# Patient Record
Sex: FEMALE | Race: WHITE | Employment: UNEMPLOYED | URBAN - METROPOLITAN AREA
[De-identification: names, ages, dates, MRNs, and addresses within clinical notes are randomized per-mention and may not be internally consistent; named-entity substitution may affect disease eponyms.]

---

## 2017-01-11 ENCOUNTER — OFFICE VISIT (OUTPATIENT)
Dept: NEUROLOGY | Age: 52
End: 2017-01-11

## 2017-01-11 VITALS
BODY MASS INDEX: 38.42 KG/M2 | HEIGHT: 62 IN | SYSTOLIC BLOOD PRESSURE: 168 MMHG | HEART RATE: 70 BPM | TEMPERATURE: 98.9 F | WEIGHT: 208.8 LBS | OXYGEN SATURATION: 99 % | DIASTOLIC BLOOD PRESSURE: 97 MMHG

## 2017-01-11 DIAGNOSIS — I67.9 CEREBROVASCULAR DISEASE: ICD-10-CM

## 2017-01-11 DIAGNOSIS — I10 ESSENTIAL HYPERTENSION WITH GOAL BLOOD PRESSURE LESS THAN 130/80: ICD-10-CM

## 2017-01-11 DIAGNOSIS — I69.30 LATE EFFECT OF CEREBROVASCULAR ACCIDENT: ICD-10-CM

## 2017-01-11 DIAGNOSIS — Z74.09 IMPAIRED MOBILITY AND ADLS: ICD-10-CM

## 2017-01-11 DIAGNOSIS — G81.93: Primary | ICD-10-CM

## 2017-01-11 DIAGNOSIS — Z86.73 HISTORY OF CVA (CEREBROVASCULAR ACCIDENT): ICD-10-CM

## 2017-01-11 DIAGNOSIS — Z78.9 IMPAIRED MOBILITY AND ADLS: ICD-10-CM

## 2017-01-11 RX ORDER — BACLOFEN 10 MG/1
10 TABLET ORAL 3 TIMES DAILY
Qty: 90 TAB | Refills: 3 | Status: SHIPPED | OUTPATIENT
Start: 2017-01-11 | End: 2017-04-12 | Stop reason: SDUPTHER

## 2017-01-11 NOTE — PROGRESS NOTES
[facility]   [address] [place] [address], [address] [facility], [address] [address]      Date:     Name:  Emilee Joseph  :  1965  MRN:  [id number]     PCP:  Romulo Wilde NP    Chief Complaint   Patient presents with    Extremity Weakness     Follow-up on right sided weakness         HISTORY OF PRESENT ILLNESS:  Patient returns in followup from hospitalization for stroke She  remains weak on the right side weaker on the leg. She she is able to walk in a.using a cane. She reports she still is unable to stand for 8 hours, which would be a job requirement. She describes pain stiffness right leg lyle with weight bearing disrupting sleep as well She denies any recent TIA like symptoms. She continues to have decreased sensation right side as well. Speech is improved. no going to therapy  Review of Systems - History obtained from the patient  General ROS: negative  Psychological ROS: negative  Cardiovascular ROS: no chest pain or dyspnea on exertion  Gastrointestinal ROS: no abdominal pain, change in bowel habits, or black or bloody stools  Musculoskeletal ROS: positive for - muscular weakness  Neurological ROS: no TIA or stroke symptoms since hospitalization  Dermatological ROS: negative     Current Outpatient Prescriptions   Medication Sig    SITagliptin (JANUVIA) 50 mg tablet Take 50 mg by mouth daily.  baclofen (LIORESAL) 10 mg tablet Take 1 Tab by mouth three (3) times daily.  furosemide (LASIX) 20 mg tablet Take  by mouth daily.  carvedilol (COREG) 12.5 mg tablet Take 0.5 Tabs by mouth two (2) times daily (with meals). Indications: HYPERTENSION    atorvastatin (LIPITOR) 80 mg tablet Take 1 Tab by mouth nightly.  losartan (COZAAR) 25 mg tablet Take 1 Tab by mouth daily. Indications: HYPERTENSION    cholecalciferol (VITAMIN D3) 1,000 unit tablet Take 2 Tabs by mouth daily.  aspirin 81 mg chewable tablet Take 1 Tab by mouth daily.     metFORMIN ER (GLUCOPHAGE XR) 500 mg tablet Take 800 mg by mouth two (2) times a day. Indications: type 2 diabetes mellitus     No current facility-administered medications for this visit. No Known Allergies  Past Medical History   Diagnosis Date    Acute ischemic multifocal anterior circulation stroke involving left-sided vessel ([facility] 4/2/2016     Acute Ischemic Multifocal Stroke (acute multifocal infarctions in the left frontal/parietal lobe and left temporal lobe) with residual right hemiparesis and expressive aphasia     Coronary artery disease involving native coronary artery of native heart     Decreased calculated glomerular filtration rate (GFR) 4/2/2016     Calculated GFR is equivalent to that of CKD stage 2 (60-89 ml/min)    Diastolic dysfunction without heart failure 7/29/2014    Dyslipidemia     Essential hypertension     History of myocardial infarction      2x    Memory disorder      Difficulty     Obesity, Class I, BMI 30-34.9     Received intravenous tissue plasminogen activator (tPA) in emergency department 4/2/2016    Tobacco abuse     Type 2 diabetes mellitus ([facility]     Vitamin D insufficiency 4/8/2016     Vitamin D 25-Hydroxy (4/8/2016) = 24.9     Past Surgical History   Procedure Laterality Date    Hx orthopaedic Left      wrist    Hx adenoidectomy      Hx tubal ligation      Hx other surgical       S/P Tongue clipping    Hx other surgical       S/P Incision and drainage of Staph infection on left thigh     Social History     Social History    Marital status: LEGALLY      Spouse name: N/A    Number of children: N/A    Years of education: N/A     Occupational History    Not on file. Social History Main Topics    Smoking status: Former Smoker     Packs/day: 1.50     Years: 38.00     Quit date: 4/2/2016    Smokeless tobacco: Never Used    Alcohol use No    Drug use: No    Sexual activity: Not on file     Other Topics Concern    Not on file     Social History Narrative     History reviewed.  No pertinent family history. PHYSICAL EXAMINATION:    Visit Vitals    BP (!) 168/97    Pulse 70    Temp 98.9 °F (37.2 °C) (Oral)    Ht 5' 2\" (1.575 m)    Wt 94.7 kg (208 lb 12.8 oz)    SpO2 99%    BMI 38.19 kg/m2     General:  Well defined, nourished, and groomed individual in no acute distress. Neck: Supple, nontender, thyroid within normal limits,, no bruits, no pain with resistance to active range of motion. Heart: Regular rate and rhythm, no murmurs, rub, or gallop. Normal S1S2. Lungs:  Clear to auscultation  Musculoskeletal:  Extremities revealed no edema  Psych:  Good mood and bright affect    NEUROLOGICAL EXAMINATION:     Mental Status:   Alert and oriented to person, place, and time with recent and remote memory intact. Attention span and concentration are normal. Speech is fluent with a full fund of knowledge. Cranial Nerves:    II, III, IV, VI:  Visual acuity grossly intact. Visual fields are normal.    Pupils are equal, round, and reactive to light and accommodation. Extra-ocular movements are full and fluid. no ptosis or nystagmus. V-XII: Facial features are symmetric, with normal sensation and strength. Motor Examination: 5/5 muscle strength throughout left side mild rle weakness . No cogwheel rigidity or clonus present. increased   Tone rle    Coordination:  No resting or intention tremor. right hemiparetic gait        Lab/Data Review:  Reviewed notes from hospitalization,Dr Winston Colon,2Nd Floor ICD-9-CM    1. Hemiplegia affecting right nondominant side (HCC) G81.93 342.92 REFERRAL TO PHYSICAL THERAPY   2. Essential hypertension with goal blood pressure less than 130/80 I10 401.9    3. Impaired mobility and ADLs Z74.09 799.89    4.  History of CVA (cerebrovascular accident) Z86.73 V12.54 REFERRAL TO PHYSICAL THERAPY   5. Cerebrovascular disease I67.9 437.9 REFERRAL TO PHYSICAL THERAPY   6. Late effect of cerebrovascular accident I69.30 438.9 REFERRAL TO PHYSICAL THERAPY       Nadegedystceli Neighbor a 45 y.o. y.o.  who has risk factors including hypertension,diabetes,smoking   Plan:   Reviewed risks and benefits of baclofen therapy will try pt for gait training  Not able to return to working standing . walking  8 hrs. day may stop plavix since greater than 3 months without stroke   I spent 30 minutes with the patient in face-to-face consultation, of which greater than 50% was spent in counseling and coordination of care as described above.

## 2017-01-18 ENCOUNTER — DOCUMENTATION ONLY (OUTPATIENT)
Dept: NEUROLOGY | Age: 52
End: 2017-01-18

## 2017-01-18 NOTE — PROGRESS NOTES
Received notification from patient that she doesn;t want to go to [address] but prefers  [facility] in . [place] [address], [facility], [address] [facility]  [phone]    Records and Referral re-faxed to this company per patient request.

## 2017-04-10 ENCOUNTER — DOCUMENTATION ONLY (OUTPATIENT)
Dept: NEUROLOGY | Age: 52
End: 2017-04-10

## 2017-04-10 NOTE — PROGRESS NOTES
Letter of peer review and request for agreement of documentation rec'd from [facility] and placed on Dr. Conner kern

## 2017-04-12 ENCOUNTER — OFFICE VISIT (OUTPATIENT)
Dept: NEUROLOGY | Age: 52
End: 2017-04-12

## 2017-04-12 VITALS
TEMPERATURE: 99 F | HEIGHT: 62 IN | BODY MASS INDEX: 39.34 KG/M2 | WEIGHT: 213.8 LBS | DIASTOLIC BLOOD PRESSURE: 68 MMHG | RESPIRATION RATE: 18 BRPM | HEART RATE: 75 BPM | OXYGEN SATURATION: 98 % | SYSTOLIC BLOOD PRESSURE: 136 MMHG

## 2017-04-12 DIAGNOSIS — I67.9 CEREBROVASCULAR DISEASE: ICD-10-CM

## 2017-04-12 DIAGNOSIS — Z86.73 HISTORY OF CVA (CEREBROVASCULAR ACCIDENT): ICD-10-CM

## 2017-04-12 DIAGNOSIS — Z74.09 IMPAIRED MOBILITY AND ADLS: ICD-10-CM

## 2017-04-12 DIAGNOSIS — G81.03: Primary | ICD-10-CM

## 2017-04-12 DIAGNOSIS — I10 ESSENTIAL HYPERTENSION WITH GOAL BLOOD PRESSURE LESS THAN 130/80: ICD-10-CM

## 2017-04-12 DIAGNOSIS — Z78.9 IMPAIRED MOBILITY AND ADLS: ICD-10-CM

## 2017-04-12 RX ORDER — GLIPIZIDE 5 MG/1
5 TABLET ORAL DAILY
COMMUNITY

## 2017-04-12 RX ORDER — LANOLIN ALCOHOL/MO/W.PET/CERES
2500 CREAM (GRAM) TOPICAL 2 TIMES DAILY
COMMUNITY

## 2017-04-12 RX ORDER — FISH OIL/DHA/EPA 1200-144MG
1200 CAPSULE ORAL 2 TIMES DAILY
COMMUNITY

## 2017-04-12 RX ORDER — BACLOFEN 10 MG/1
20 TABLET ORAL 3 TIMES DAILY
Qty: 90 TAB | Refills: 5 | Status: SHIPPED | OUTPATIENT
Start: 2017-04-12 | End: 2017-07-25 | Stop reason: SDUPTHER

## 2017-04-12 NOTE — MR AVS SNAPSHOT
Visit Information Date & Time Provider Department Dept. Phone Encounter #  
 4/12/2017  1:45 PM Aneta Barorn MD [address] [phone] [id number] Follow-up Instructions Return in about 6 months (around 10/12/2017). Follow-up and Disposition History Your Appointments 10/18/2017 11:00 AM  
Follow Up with Aneta Barron MD  
[address] Appt Note: 6mon f/u  
 [address] [facility] [facility] [address] [place] [address]  
[phone]  
  
   
 [facility] [address] Upcoming Health Maintenance Date Due Hepatitis C Screening 1965 FOOT EXAM Q1 2/14/1974 MICROALBUMIN Q1 2/14/1974 EYE EXAM RETINAL OR DILATED Q1 2/14/1974 DTaP/Tdap/Td series (1 - Tdap) 2/14/1985 PAP AKA CERVICAL CYTOLOGY 2/14/1985 BREAST CANCER SCRN MAMMOGRAM 2/14/2014 FOBT Q 1 YEAR AGE 50-75 2/14/2014 INFLUENZA AGE 9 TO ADULT 8/1/2016 HEMOGLOBIN A1C Q6M 10/4/2016 LIPID PANEL Q1 4/4/2017 Allergies as of 4/12/2017  Review Complete On: 4/12/2017 By: Aneta Barron MD  
 No Known Allergies Current Immunizations  Reviewed on 4/4/2016 Name Date Influenza Vaccine 10/8/2015 Pneumococcal Polysaccharide (PPSV-23) 10/8/2015 Not reviewed this visit You Were Diagnosed With   
  
 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 
ICD-9-CM: 342.02 Essential hypertension with goal blood pressure less than 130/80     ICD-10-CM: I10 
ICD-9-CM: 401.9 Impaired mobility and ADLs     ICD-10-CM: Z74.09 
ICD-9-CM: 799.89 History of CVA (cerebrovascular accident)     ICD-10-CM: Z86.73 
ICD-9-CM: V12.54 Cerebrovascular disease     ICD-10-CM: I67.9 ICD-9-CM: 437.9 Vitals BP Pulse Temp Resp Height(growth percentile) Weight(growth percentile) 136/68 75 99 °F (37.2 °C) (Oral) 18 5' 2\" (1.575 m) 213 lb 12.8 oz (97 kg) SpO2 BMI OB Status Smoking Status 98% 39.1 kg/m2 Postmenopausal Former Smoker BMI and BSA Data Body Mass Index Body Surface Area  
 39.1 kg/m 2 2.06 m 2 Preferred Pharmacy Pharmacy Name Phone [facility] [address] [address], [address] [place] [phone] Your Updated Medication List  
  
   
This list is accurate as of: 4/12/17  3:21 PM.  Always use your most recent med list.  
  
  
  
  
 aspirin 81 mg chewable tablet Take 1 Tab by mouth daily. atorvastatin 80 mg tablet Commonly known as:  LIPITOR Take 1 Tab by mouth nightly. baclofen 10 mg tablet Commonly known as:  LIORESAL Take 2 Tabs by mouth three (3) times daily. carvedilol 12.5 mg tablet Commonly known as:  [place] Flatten Take 0.5 Tabs by mouth two (2) times daily (with meals). Indications: HYPERTENSION  
  
 cholecalciferol 1,000 unit tablet Commonly known as:  VITAMIN D3 Take 2 Tabs by mouth daily. fish oil-dha-epa 1,200-144-216 mg Cap Take 1,200 mg by mouth two (2) times a day. furosemide 20 mg tablet Commonly known as:  LASIX Take  by mouth daily. glipiZIDE 5 mg tablet Commonly known as:  [place] Hernandez Take 5 mg by mouth daily. JANUVIA 50 mg tablet Generic drug:  SITagliptin Take 50 mg by mouth daily. losartan 25 mg tablet Commonly known as:  COZAAR Take 1 Tab by mouth daily. Indications: HYPERTENSION  
  
 metFORMIN  mg tablet Commonly known as:  GLUCOPHAGE XR Take 800 mg by mouth two (2) times a day. Indications: type 2 diabetes mellitus VITAMIN B-12 1,000 mcg tablet Generic drug:  cyanocobalamin Take 2,500 mcg by mouth two (2) times a day. Prescriptions Sent to Pharmacy Refills  
 baclofen (LIORESAL) 10 mg tablet 5 Sig: Take 2 Tabs by mouth three (3) times daily.   
 Class: Normal  
 Pharmacy: [facility] [address] [facility] #: [phone] Route: Oral  
  
Follow-up Instructions Return in about 6 months (around 10/12/2017). Introducing [facility] SERVICES! Dear Lonny Lara: Thank you for requesting a [employer] account. Our records indicate that you already have an active [employer] account. You can access your account anytime at [email or web address] Did you know that you can access your hospital and ER discharge instructions at any time in [employer]? You can also review all of your test results from your hospital stay or ER visit. Additional Information If you have questions, please visit the Frequently Asked Questions section of the [employer] website at [email or web address] Remember, [employer] is NOT to be used for urgent needs. For medical emergencies, dial 911. Now available from your iPhone and Android! Please provide this summary of care documentation to your next provider. Your primary care clinician is listed as [place]. If you have any questions after today's visit, please call [phone].

## 2017-04-12 NOTE — PROGRESS NOTES
[address], [address] [facility], [address]      Date:     Name:  Pepe Angeles  :  1965  MRN:  [id number]     PCP:  Sade Maldonado NP    Chief Complaint   Patient presents with    Neurologic Problem     followup CVA/physical therapy         HISTORY OF PRESENT ILLNESS:  Patient returns in followup from hospitalization for stroke She  remains weak on the right side weaker on the leg. She she is able to walk in a.using a cane. She reports she still is unable to stand for 8 hours, which would be a job requirement. She describes pain stiffness right leg lyle with weight bearing disrupting sleep as well She denies any recent TIA like symptoms. She continues to have decreased sensation right side as well. Pt helped per patients no notes received  Review of Systems - History obtained from the patient  General ROS: negative  Psychological ROS: negative  Cardiovascular ROS: no chest pain or dyspnea on exertion  Gastrointestinal ROS: no abdominal pain, change in bowel habits, or black or bloody stools  Musculoskeletal ROS: positive for - muscular weakness  Neurological ROS: no TIA or stroke symptoms since hospitalization  Dermatological ROS: negative     Current Outpatient Prescriptions   Medication Sig    glipiZIDE (GLUCOTROL) 5 mg tablet Take 5 mg by mouth daily.  fish oil-dha-epa 1,200-144-216 mg cap Take 1,200 mg by mouth two (2) times a day.  cyanocobalamin (VITAMIN B-12) 1,000 mcg tablet Take 2,500 mcg by mouth two (2) times a day.  baclofen (LIORESAL) 10 mg tablet Take 2 Tabs by mouth three (3) times daily.  carvedilol (COREG) 12.5 mg tablet Take 0.5 Tabs by mouth two (2) times daily (with meals). Indications: HYPERTENSION    atorvastatin (LIPITOR) 80 mg tablet Take 1 Tab by mouth nightly.  losartan (COZAAR) 25 mg tablet Take 1 Tab by mouth daily. Indications: HYPERTENSION (Patient taking differently: Take 100 mg by mouth daily.  Indications: hypertension)    cholecalciferol (VITAMIN D3) 1,000 unit tablet Take 2 Tabs by mouth daily.  aspirin 81 mg chewable tablet Take 1 Tab by mouth daily.  metFORMIN ER (GLUCOPHAGE XR) 500 mg tablet Take 800 mg by mouth two (2) times a day. Indications: type 2 diabetes mellitus    SITagliptin (JANUVIA) 50 mg tablet Take 50 mg by mouth daily.  furosemide (LASIX) 20 mg tablet Take  by mouth daily. No current facility-administered medications for this visit. No Known Allergies  Past Medical History:   Diagnosis Date    Acute ischemic multifocal anterior circulation stroke involving left-sided vessel ([facility] 4/2/2016    Acute Ischemic Multifocal Stroke (acute multifocal infarctions in the left frontal/parietal lobe and left temporal lobe) with residual right hemiparesis and expressive aphasia     Coronary artery disease involving native coronary artery of native heart     Decreased calculated glomerular filtration rate (GFR) 4/2/2016    Calculated GFR is equivalent to that of CKD stage 2 (60-89 ml/min)    Diastolic dysfunction without heart failure 7/29/2014    Dyslipidemia     Essential hypertension     History of myocardial infarction     2x    Memory disorder     Difficulty     Obesity, Class I, BMI 30-34.9     Received intravenous tissue plasminogen activator (tPA) in emergency department 4/2/2016    Tobacco abuse     Type 2 diabetes mellitus ([facility]     Vitamin D insufficiency 4/8/2016    Vitamin D 25-Hydroxy (4/8/2016) = 24.9     Past Surgical History:   Procedure Laterality Date    HX ADENOIDECTOMY      HX ORTHOPAEDIC Left     wrist    HX OTHER SURGICAL      S/P Tongue clipping    HX OTHER SURGICAL      S/P Incision and drainage of Staph infection on left thigh    HX TUBAL LIGATION       Social History     Social History    Marital status: LEGALLY      Spouse name: N/A    Number of children: N/A    Years of education: N/A     Occupational History    Not on file. Social History Main Topics    Smoking status: Former Smoker     Packs/day: 1.50     Years: 38.00     Quit date: 4/2/2016    Smokeless tobacco: Never Used    Alcohol use No    Drug use: No    Sexual activity: Not on file     Other Topics Concern    Not on file     Social History Narrative     No family history on file. PHYSICAL EXAMINATION:    Visit Vitals    /68    Pulse 75    Temp 99 °F (37.2 °C) (Oral)    Resp 18    Ht 5' 2\" (1.575 m)    Wt 97 kg (213 lb 12.8 oz)    SpO2 98%    BMI 39.1 kg/m2     General:  Well defined, nourished, and groomed individual in no acute distress. Neck: Supple, nontender, thyroid within normal limits,, no bruits, no pain with resistance to active range of motion. Heart: Regular rate and rhythm, no murmurs, rub, or gallop. Normal S1S2. Lungs:  Clear to auscultation  Musculoskeletal:  Extremities revealed no edema  Psych:  Good mood and bright affect    NEUROLOGICAL EXAMINATION:     Mental Status:   Alert and oriented to person, place, and time with recent and remote memory intact. Attention span and concentration are normal. Speech is fluent with a full fund of knowledge. Cranial Nerves:    II, III, IV, VI:  Visual acuity grossly intact. Visual fields are normal.    Pupils are equal, round, and reactive to light and accommodation. Extra-ocular movements are full and fluid. no ptosis or nystagmus. V-XII: Facial features are symmetric, with normal sensation and strength. Motor Examination: 5/5 muscle strength throughout left side mild rle weakness . No cogwheel rigidity or clonus present. increased   Tone rle    Coordination:  No resting or intention tremor. right hemiparetic gait        Lab/Data Review:  Reviewed notes from hospitalization,Dr Winston Alcaraz.,2Nd Floor ICD-9-CM    1. 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  Essential hypertension with goal blood pressure less than 130/80 I10 401.9    3. Impaired mobility and ADLs Z74.09 799.89    4. History of CVA (cerebrovascular accident) Z86.73 V12.54    5. Cerebrovascular disease I67.9 437.9        Kika Davis luisito 47 y.o. y.o.  who has risk factors including hypertension,diabetes,smoking   Plan:   Reviewed risks and benefits of baclofen therapyPatient may benefit from functional capacity evaluation if desired by workplace  Not able to return to working standing . walking  8 hrs. day  I spent 30 minutes with the patient in face-to-face consultation, of which greater than 50% was spent in counseling and coordination of care as described above.

## 2017-04-26 ENCOUNTER — DOCUMENTATION ONLY (OUTPATIENT)
Dept: NEUROLOGY | Age: 52
End: 2017-04-26

## 2017-04-26 NOTE — PROGRESS NOTES
Records recevied from [facility] signed by Dr. Jarad Harvey and copy faxed back (Scan)

## 2017-11-14 RX ORDER — BACLOFEN 20 MG/1
20 TABLET ORAL 3 TIMES DAILY
Qty: 90 TAB | Refills: 2 | OUTPATIENT
Start: 2017-11-14

## 2017-11-14 NOTE — TELEPHONE ENCOUNTER
Requested Prescriptions     Pending Prescriptions Disp Refills    baclofen (LIORESAL) 20 mg tablet 90 Tab 2     Sig: Take 1 Tab by mouth three (3) times daily. Pt states has none left. Would like script sent to [facility] in [place]. Pt also is asking if there is anything else she can take that may be cheaper?

## 2018-01-03 ENCOUNTER — OFFICE VISIT (OUTPATIENT)
Dept: NEUROLOGY | Age: 53
End: 2018-01-03

## 2018-01-03 VITALS
OXYGEN SATURATION: 98 % | SYSTOLIC BLOOD PRESSURE: 140 MMHG | HEIGHT: 62 IN | RESPIRATION RATE: 18 BRPM | TEMPERATURE: 98.3 F | HEART RATE: 83 BPM | WEIGHT: 220 LBS | BODY MASS INDEX: 40.48 KG/M2 | DIASTOLIC BLOOD PRESSURE: 80 MMHG

## 2018-01-03 DIAGNOSIS — I67.9 CEREBROVASCULAR DISEASE: ICD-10-CM

## 2018-01-03 DIAGNOSIS — G81.13: Primary | ICD-10-CM

## 2018-01-03 DIAGNOSIS — I67.9: Primary | ICD-10-CM

## 2018-01-03 DIAGNOSIS — Z86.73 HISTORY OF CVA (CEREBROVASCULAR ACCIDENT): ICD-10-CM

## 2018-01-03 DIAGNOSIS — I69.30 LATE EFFECT OF CEREBROVASCULAR ACCIDENT: ICD-10-CM

## 2018-01-03 DIAGNOSIS — I10 ESSENTIAL HYPERTENSION WITH GOAL BLOOD PRESSURE LESS THAN 130/80: ICD-10-CM

## 2018-01-03 RX ORDER — AMLODIPINE BESYLATE 10 MG/1
TABLET ORAL DAILY
COMMUNITY

## 2018-01-03 RX ORDER — BACLOFEN 20 MG/1
20 TABLET ORAL 3 TIMES DAILY
Qty: 90 TAB | Refills: 5 | Status: SHIPPED | OUTPATIENT
Start: 2018-01-03

## 2018-01-03 NOTE — PROGRESS NOTES
[facility]   [address] [facility], [address] [facility], [address]      Date:     Name:  Ana Rosenthal  :  1965  MRN:  [id number]     PCP:  Lorenzo Davila NP    Chief Complaint   Patient presents with    Neurologic Problem     f/u flacid hemiplegia         HISTORY OF PRESENT ILLNESS:  Patient returns in followup from hospitalization for stroke She  remains weak on the right side weaker on the leg. She she is able to walk in a.using a cane. She reports she still is unable to stand for 8 hours, which would be a job requirement. She describes pain stiffness right leg lyle with weight bearing disrupting sleep as well She denies any recent TIA like symptoms. She continues to have decreased sensation right side as well. Patient did not follow-up unscheduled appointment because she her insurance ran out. She is now self-pay. She reports that the baclofen ran out several months ago as well she was provided refills per chart but she reports she was told she needed to be seen first.  She has noted the difference off of the baclofen with increased dragging of the right side denies any new strokelike symptoms      Review of Systems - History obtained from the patient  General ROS: negative  Psychological ROS: negative  Cardiovascular ROS: no chest pain or dyspnea on exertion  Gastrointestinal ROS: no abdominal pain, change in bowel habits, or black or bloody stools  Musculoskeletal ROS: positive for - muscular weakness  Neurological ROS: no TIA or stroke symptoms since hospitalization  Dermatological ROS: negative     Current Outpatient Prescriptions   Medication Sig    amLODIPine (NORVASC) 10 mg tablet Take  by mouth daily.  glipiZIDE (GLUCOTROL) 5 mg tablet Take 5 mg by mouth daily.  fish oil-dha-epa 1,200-144-216 mg cap Take 1,200 mg by mouth two (2) times a day.  cyanocobalamin (VITAMIN B-12) 1,000 mcg tablet Take 2,500 mcg by mouth two (2) times a day.     furosemide (LASIX) 20 mg tablet Take  by mouth daily.  carvedilol (COREG) 12.5 mg tablet Take 0.5 Tabs by mouth two (2) times daily (with meals). Indications: HYPERTENSION    atorvastatin (LIPITOR) 80 mg tablet Take 1 Tab by mouth nightly.  losartan (COZAAR) 25 mg tablet Take 1 Tab by mouth daily. Indications: HYPERTENSION (Patient taking differently: Take 100 mg by mouth daily. Indications: hypertension)    cholecalciferol (VITAMIN D3) 1,000 unit tablet Take 2 Tabs by mouth daily.  aspirin 81 mg chewable tablet Take 1 Tab by mouth daily.  metFORMIN ER (GLUCOPHAGE XR) 500 mg tablet Take 850 mg by mouth two (2) times a day. Indications: type 2 diabetes mellitus    baclofen (LIORESAL) 20 mg tablet Take 1 Tab by mouth three (3) times daily.  SITagliptin (JANUVIA) 50 mg tablet Take 50 mg by mouth daily. No current facility-administered medications for this visit.       No Known Allergies  Past Medical History:   Diagnosis Date    Acute ischemic multifocal anterior circulation stroke involving left-sided vessel ([facility] [address] 4/2/2016    Acute Ischemic Multifocal Stroke (acute multifocal infarctions in the left frontal/parietal lobe and left temporal lobe) with residual right hemiparesis and expressive aphasia     Coronary artery disease involving native coronary artery of native heart     Decreased calculated glomerular filtration rate (GFR) 4/2/2016    Calculated GFR is equivalent to that of CKD stage 2 (60-89 ml/min)    Diastolic dysfunction without heart failure 7/29/2014    Dyslipidemia     Essential hypertension     History of myocardial infarction     2x    Memory disorder     Difficulty     Obesity, Class I, BMI 30-34.9     Received intravenous tissue plasminogen activator (tPA) in emergency department 4/2/2016    Tobacco abuse     Type 2 diabetes mellitus ([facility] [address]     Vitamin D insufficiency 4/8/2016    Vitamin D 25-Hydroxy (4/8/2016) = 24.9     Past Surgical History:   Procedure Laterality Date    HX ADENOIDECTOMY      HX ORTHOPAEDIC Left     wrist    HX OTHER SURGICAL      S/P Tongue clipping    HX OTHER SURGICAL      S/P Incision and drainage of Staph infection on left thigh    HX TUBAL LIGATION       Social History     Social History    Marital status: LEGALLY      Spouse name: N/A    Number of children: N/A    Years of education: N/A     Occupational History    Not on file. Social History Main Topics    Smoking status: Former Smoker     Packs/day: 1.50     Years: 38.00     Quit date: 4/2/2016    Smokeless tobacco: Never Used    Alcohol use No    Drug use: No    Sexual activity: Not on file     Other Topics Concern    Not on file     Social History Narrative     No family history on file. PHYSICAL EXAMINATION:    Visit Vitals    /80    Pulse 83    Temp 98.3 °F (36.8 °C) (Oral)    Resp 18    Ht 5' 2\" (1.575 m)    Wt 99.8 kg (220 lb)    SpO2 98%    BMI 40.24 kg/m2     General:  Well defined, nourished, and groomed individual in no acute distress. Neck: Supple, nontender, thyroid within normal limits,, no bruits, no pain with resistance to active range of motion. Heart: Regular rate and rhythm, no murmurs, rub, or gallop. Normal S1S2. Lungs:  Clear to auscultation  Musculoskeletal:  Extremities revealed no edema  Psych:  Good mood and bright affect    NEUROLOGICAL EXAMINATION:     Mental Status:   Alert and oriented to person, place, and time with recent and remote memory intact. Attention span and concentration are normal. Speech is fluent with a full fund of knowledge. Cranial Nerves:    II, III, IV, VI:  Visual acuity grossly intact. Visual fields are normal.    Pupils are equal, round, and reactive to light and accommodation. Extra-ocular movements are full and fluid. no ptosis or nystagmus. V-XII: Facial features are symmetric, with normal sensation and strength.          Motor Examination: 5/5 muscle strength throughout left side mild rle weakness . No cogwheel rigidity or clonus present. increased   Tone rle    Coordination:  No resting or intention tremor. right hemiparetic gait        Lab/Data Review:  Reviewed notes from hospitalization,Dr Winston Colon,[address] Floor ICD-9-CM    1. Spastic hemiplegia of right nondominant side due to cerebrovascular disease, unspecified cerebrovascular disease type (HCC) G81.13 342.12     I67.9 437.9    2. Essential hypertension with goal blood pressure less than 130/80 I10 401.9    3. History of CVA (cerebrovascular accident) Z86.73 V12.54    4. Cerebrovascular disease I67.9 437.9    5. Late effect of cerebrovascular accident I69.30 438.9        Rola Mathewsosse a 47 y.o. y.o.  who has risk factors including hypertension,diabetes,smoking   Plan:   Reviewed risks and benefits of baclofen therapyPatient may benefit from functional capacity evaluation if desired by workplace  Not able to return to working standing . walking  8 hrs. day  I spent 30 minutes with the patient in face-to-face consultation, of which greater than 50% was spent in counseling and coordination of care as described above.

## 2018-01-03 NOTE — MR AVS SNAPSHOT
Visit Information Date & Time Provider Department Dept. Phone Encounter #  
 1/3/2018  1:00 PM Gavino Oneal MD [employer] at [address] [place], [place] [id number] Follow-up Instructions Return in about 6 months (around 7/3/2018). Follow-up and Disposition History Upcoming Health Maintenance Date Due Hepatitis C Screening 1965 FOOT EXAM Q1 2/14/1974 MICROALBUMIN Q1 2/14/1974 EYE EXAM RETINAL OR DILATED Q1 2/14/1974 DTaP/Tdap/Td series (1 - Tdap) 2/14/1985 PAP AKA CERVICAL CYTOLOGY 2/14/1985 BREAST CANCER SCRN MAMMOGRAM 2/14/2014 FOBT Q 1 YEAR AGE 50-75 2/14/2014 HEMOGLOBIN A1C Q6M 10/4/2016 LIPID PANEL Q1 4/4/2017 Influenza Age 5 to Adult 8/1/2017 Allergies as of 1/3/2018  Review Complete On: 1/3/2018 By: Gavino Oneal MD  
 No Known Allergies Current Immunizations  Reviewed on 4/4/2016 Name Date Influenza Vaccine 10/8/2015 Pneumococcal Polysaccharide (PPSV-23) 10/8/2015 Not reviewed this visit You Were Diagnosed With   
  
 Codes Comments Spastic hemiplegia of right nondominant side due to cerebrovascular disease, unspecified [facility] disease [facility]    -  Primary ICD-10-CM: G81.13, I67.9 ICD-9-CM: 342.12, 437.9 Essential hypertension with goal blood pressure less than 130/80     ICD-10-CM: I10 
ICD-9-CM: 401.9 History of CVA (cerebrovascular accident)     ICD-10-CM: Z86.73 
ICD-9-CM: V12.54 Cerebrovascular disease     ICD-10-CM: I67.9 ICD-9-CM: 437.9 Late effect of cerebrovascular accident     ICD-10-CM: I69.30 ICD-9-CM: 438.9 Vitals BP Pulse Temp Resp Height(growth percentile) Weight(growth percentile) 140/80 83 98.3 °F (36.8 °C) (Oral) 18 5' 2\" (1.575 m) 220 lb (99.8 kg) SpO2 BMI OB Status Smoking Status 98% 40.24 kg/m2 Postmenopausal Former Smoker BMI and BSA Data  Body Mass Index Body Surface Area  
 40.24 kg/m 2 2.09 m 2  
  
 Preferred Pharmacy Pharmacy Name Phone [address], [address] [employer] [place] [phone] Your Updated Medication List  
  
   
This list is accurate as of: 1/3/18  1:46 PM.  Always use your most recent med list.  
  
  
  
  
 aspirin 81 mg chewable tablet Take 1 Tab by mouth daily. atorvastatin 80 mg tablet Commonly known as:  LIPITOR Take 1 Tab by mouth nightly. baclofen 20 mg tablet Commonly known as:  LIORESAL Take 1 Tab by mouth three (3) times daily. carvedilol 12.5 mg tablet Commonly known as:  Danni Mariae Take 0.5 Tabs by mouth two (2) times daily (with meals). Indications: HYPERTENSION  
  
 cholecalciferol 1,000 unit tablet Commonly known as:  VITAMIN D3 Take 2 Tabs by mouth daily. fish oil-dha-epa 1,200-144-216 mg Cap Take 1,200 mg by mouth two (2) times a day. furosemide 20 mg tablet Commonly known as:  LASIX Take  by mouth daily. glipiZIDE 5 mg tablet Commonly known as:  Garza Sickle Take 5 mg by mouth daily. JANUVIA 50 mg tablet Generic drug:  SITagliptin Take 50 mg by mouth daily. losartan 25 mg tablet Commonly known as:  COZAAR Take 1 Tab by mouth daily. Indications: HYPERTENSION  
  
 metFORMIN  mg tablet Commonly known as:  GLUCOPHAGE XR Take 850 mg by mouth two (2) times a day. Indications: type 2 diabetes mellitus NORVASC 10 mg tablet Generic drug:  amLODIPine Take  by mouth daily. VITAMIN B-12 1,000 mcg tablet Generic drug:  cyanocobalamin Take 2,500 mcg by mouth two (2) times a day. Prescriptions Sent to Pharmacy Refills  
 baclofen (LIORESAL) 20 mg tablet 5 Sig: Take 1 Tab by mouth three (3) times daily. Class: Normal  
 Pharmacy: [facility] [address] [employer] [facility] #: [phone] Route: Oral  
  
Follow-up Instructions Return in about 6 months (around 7/3/2018). Patient Instructions Patient to call if baclofen unaffordable Introducing [facility]! Dear Shorty Roles: Thank you for requesting a [employer] account. Our records indicate that you already have an active [employer] account. You can access your account anytime at [email or web address] Did you know that you can access your hospital and ER discharge instructions at any time in [employer]? You can also review all of your test results from your hospital stay or ER visit. Additional Information If you have questions, please visit the Frequently Asked Questions section of the [employer] website at [email or web address] Remember, [employer] is NOT to be used for urgent needs. For medical emergencies, dial 911. Now available from your iPhone and Android! Please provide this summary of care documentation to your next provider. Your primary care clinician is listed as [place]. If you have any questions after today's visit, please call [phone].

## 2020-10-21 ENCOUNTER — TRANSCRIBE ORDER (OUTPATIENT)
Dept: SCHEDULING | Age: 55
End: 2020-10-21

## 2020-10-21 DIAGNOSIS — Z12.31 VISIT FOR SCREENING MAMMOGRAM: Primary | ICD-10-CM

## 2020-10-27 ENCOUNTER — TRANSCRIBE ORDER (OUTPATIENT)
Dept: SCHEDULING | Age: 55
End: 2020-10-27

## 2020-10-27 DIAGNOSIS — R41.3 MEMORY LOSS: Primary | ICD-10-CM

## 2020-11-13 ENCOUNTER — TRANSCRIBE ORDER (OUTPATIENT)
Dept: SCHEDULING | Age: 55
End: 2020-11-13

## 2020-11-13 DIAGNOSIS — R41.3 MEMORY LOSS: Primary | ICD-10-CM

## 2020-11-23 ENCOUNTER — HOSPITAL ENCOUNTER (OUTPATIENT)
Dept: LAB | Age: 55
Discharge: HOME | End: 2020-11-23

## 2020-11-23 ENCOUNTER — TRANSCRIBE ORDER (OUTPATIENT)
Dept: REGISTRATION | Age: 55
End: 2020-11-23

## 2020-11-23 DIAGNOSIS — R41.3 MEMORY LOSS: ICD-10-CM

## 2020-11-23 DIAGNOSIS — R41.3 MEMORY LOSS: Primary | ICD-10-CM
